# Patient Record
Sex: FEMALE | Race: WHITE | NOT HISPANIC OR LATINO
[De-identification: names, ages, dates, MRNs, and addresses within clinical notes are randomized per-mention and may not be internally consistent; named-entity substitution may affect disease eponyms.]

---

## 2022-04-06 PROBLEM — Z00.00 ENCOUNTER FOR PREVENTIVE HEALTH EXAMINATION: Status: ACTIVE | Noted: 2022-04-06

## 2022-04-12 ENCOUNTER — NON-APPOINTMENT (OUTPATIENT)
Age: 52
End: 2022-04-12

## 2022-04-12 ENCOUNTER — APPOINTMENT (OUTPATIENT)
Dept: HEART AND VASCULAR | Facility: CLINIC | Age: 52
End: 2022-04-12
Payer: MEDICAID

## 2022-04-12 VITALS
DIASTOLIC BLOOD PRESSURE: 70 MMHG | OXYGEN SATURATION: 97 % | HEIGHT: 66 IN | WEIGHT: 290 LBS | SYSTOLIC BLOOD PRESSURE: 115 MMHG | TEMPERATURE: 98.7 F | BODY MASS INDEX: 46.61 KG/M2 | HEART RATE: 99 BPM

## 2022-04-12 PROCEDURE — 93000 ELECTROCARDIOGRAM COMPLETE: CPT | Mod: NC

## 2022-04-12 PROCEDURE — 99203 OFFICE O/P NEW LOW 30 MIN: CPT

## 2022-04-12 NOTE — DISCUSSION/SUMMARY
[FreeTextEntry1] : 50 y/o F, PMHx of OA, Depression/BPD, Active Smoking here for pre-op for Knee surgery\par \par # Pre-Op Eval\par RCRI 0, no active cardiac symptoms, can do > 4 mets of activity\par Can proceed to Orthopedic surgery without further cardiac work up\par Optimal medical therapy and risk factor reduction\par Remained of medical management as per primary medical team\par \par # Smoking\par Cessation counseling given today

## 2022-04-12 NOTE — HISTORY OF PRESENT ILLNESS
[FreeTextEntry1] : 52 y/o F, PMHx of OA, Depression/BPD, Active Smoking here for pre-op for Knee surgery\par \par Denies any active cardiac symptoms.\par Can tolerate > 4 mets of activity (climb stairs, walk several miles without chest pain SOB). Limited by her knee.\par \par Denies chest pain, SOB, palpitations, LE edema, PND/Orthopnea\par \par EKG 4/12/2022: NSR, HR 91

## 2022-10-21 ENCOUNTER — TRANSCRIPTION ENCOUNTER (OUTPATIENT)
Age: 52
End: 2022-10-21